# Patient Record
Sex: FEMALE | Race: ASIAN | Employment: UNEMPLOYED | URBAN - METROPOLITAN AREA
[De-identification: names, ages, dates, MRNs, and addresses within clinical notes are randomized per-mention and may not be internally consistent; named-entity substitution may affect disease eponyms.]

---

## 2017-08-16 ENCOUNTER — OFFICE VISIT (OUTPATIENT)
Dept: FAMILY MEDICINE CLINIC | Facility: CLINIC | Age: 37
End: 2017-08-16

## 2017-08-16 ENCOUNTER — HOSPITAL ENCOUNTER (OUTPATIENT)
Dept: GENERAL RADIOLOGY | Age: 37
Discharge: HOME | End: 2017-08-16
Attending: FAMILY MEDICINE
Payer: COMMERCIAL

## 2017-08-16 VITALS
BODY MASS INDEX: 21.44 KG/M2 | WEIGHT: 121 LBS | HEART RATE: 80 BPM | TEMPERATURE: 98 F | HEIGHT: 63 IN | DIASTOLIC BLOOD PRESSURE: 72 MMHG | SYSTOLIC BLOOD PRESSURE: 110 MMHG | RESPIRATION RATE: 16 BRPM

## 2017-08-16 DIAGNOSIS — Z00.00 ROUTINE PHYSICAL EXAMINATION: ICD-10-CM

## 2017-08-16 DIAGNOSIS — M54.12 CERVICAL RADICULOPATHY: ICD-10-CM

## 2017-08-16 DIAGNOSIS — Z00.00 ROUTINE PHYSICAL EXAMINATION: Primary | ICD-10-CM

## 2017-08-16 PROCEDURE — 99395 PREV VISIT EST AGE 18-39: CPT | Performed by: FAMILY MEDICINE

## 2017-08-16 PROCEDURE — 72040 X-RAY EXAM NECK SPINE 2-3 VW: CPT | Performed by: FAMILY MEDICINE

## 2017-08-16 PROCEDURE — 90471 IMMUNIZATION ADMIN: CPT | Performed by: FAMILY MEDICINE

## 2017-08-16 PROCEDURE — 90715 TDAP VACCINE 7 YRS/> IM: CPT | Performed by: FAMILY MEDICINE

## 2017-08-16 RX ORDER — DOXEPIN HYDROCHLORIDE 50 MG/1
1 CAPSULE ORAL DAILY
COMMUNITY

## 2017-08-16 RX ORDER — METHYLPREDNISOLONE 4 MG/1
TABLET ORAL
Qty: 1 KIT | Refills: 0 | Status: SHIPPED | OUTPATIENT
Start: 2017-08-16 | End: 2020-02-07

## 2017-08-16 NOTE — PROGRESS NOTES
Blood pressure 110/72, pulse 80, temperature 98.3 °F (36.8 °C), temperature source Oral, resp. rate 16, height 5' 3\" (1.6 m), weight 121 lb (54.9 kg).     REASON FOR VISIT:    Puneet Oliver is a 43year old female who presents for an [address] [place] [address] No results found for: FOB, OCCULTSTOOL    Obesity Screening Screen all adults annually Body mass index is 21.43 kg/m².       Preventive Services for Which Recommendations Vary with Risk Recommendation Internal Lab or Procedure External Lab or Procedure   Ch Take 1 tablet by mouth daily. Disp:  Rfl:    methylPREDNISolone (MEDROL) 4 MG Oral Tablet Therapy Pack As directed. Disp: 1 kit Rfl: 0      MEDICAL INFORMATION:   History reviewed. No pertinent past medical history.    Past Surgical History:  2012: C-SECTIO back  EXTREMITIES: no cyanosis, clubbing or edema  NEURO: Oriented times three, cranial nerves are intact, motor and sensory are grossly intact  NEG PHALENS/SPURLING   CN II-XII GROSSLY INTACT MUSCLE STRENGTH +5/5 UPPER AND LOWER EXTREMITIES B/L DTR'S UPPE

## 2017-08-24 ENCOUNTER — APPOINTMENT (OUTPATIENT)
Dept: LAB | Age: 37
End: 2017-08-24
Attending: FAMILY MEDICINE
Payer: COMMERCIAL

## 2017-08-24 DIAGNOSIS — M54.12 CERVICAL RADICULOPATHY: ICD-10-CM

## 2017-08-24 DIAGNOSIS — Z00.00 ROUTINE PHYSICAL EXAMINATION: ICD-10-CM

## 2017-08-24 LAB
CHOLEST SERPL-MCNC: 143 MG/DL (ref 110–200)
GLUCOSE SERPL-MCNC: 73 MG/DL (ref 70–99)
HDLC SERPL-MCNC: 47 MG/DL
LDLC SERPL CALC-MCNC: 73 MG/DL (ref 0–99)
NONHDLC SERPL-MCNC: 96 MG/DL
TRIGL SERPL-MCNC: 115 MG/DL (ref 1–149)

## 2017-08-24 PROCEDURE — 36415 COLL VENOUS BLD VENIPUNCTURE: CPT

## 2017-08-24 PROCEDURE — 80061 LIPID PANEL: CPT

## 2017-08-24 PROCEDURE — 82947 ASSAY GLUCOSE BLOOD QUANT: CPT

## 2017-08-25 ENCOUNTER — TELEPHONE (OUTPATIENT)
Dept: FAMILY MEDICINE CLINIC | Facility: CLINIC | Age: 37
End: 2017-08-25

## 2017-08-30 ENCOUNTER — OFFICE VISIT (OUTPATIENT)
Dept: OBGYN CLINIC | Facility: CLINIC | Age: 37
End: 2017-08-30

## 2017-08-30 VITALS — WEIGHT: 122 LBS | DIASTOLIC BLOOD PRESSURE: 71 MMHG | BODY MASS INDEX: 22 KG/M2 | SYSTOLIC BLOOD PRESSURE: 104 MMHG

## 2017-08-30 DIAGNOSIS — Z30.431 IUD CHECK UP: ICD-10-CM

## 2017-08-30 DIAGNOSIS — T83.32XA INTRAUTERINE CONTRACEPTIVE DEVICE THREADS LOST, INITIAL ENCOUNTER: ICD-10-CM

## 2017-08-30 DIAGNOSIS — Z01.419 ENCOUNTER FOR GYNECOLOGICAL EXAMINATION WITHOUT ABNORMAL FINDING: Primary | ICD-10-CM

## 2017-08-30 PROCEDURE — 99385 PREV VISIT NEW AGE 18-39: CPT | Performed by: OBSTETRICS & GYNECOLOGY

## 2017-08-30 NOTE — PROGRESS NOTES
Annual Gyn Exam    HPI Ms. Sly Juan is a new patient to me and she is here for an annual gynecologic evaluation.   She also is here for an IUD check as her IUD string was not visualized the last time she saw her previous gynecologist.  She had been instructed Negative for dyspareunia, dysuria, frequency, hematuria and vaginal discharge. Patient is amenorrheic with Mirena IUD   Neurological: Negative for headaches.        Physical Exam   Genitourinary: Vagina normal and uterus normal. There is no rash, ten

## 2017-09-01 LAB
HPV I/H RISK 1 DNA SPEC QL NAA+PROBE: NEGATIVE
LAST PAP RESULT: NORMAL

## 2017-10-05 ENCOUNTER — TELEPHONE (OUTPATIENT)
Dept: OBGYN CLINIC | Facility: CLINIC | Age: 37
End: 2017-10-05

## 2018-08-30 PROBLEM — Z97.5 IUD (INTRAUTERINE DEVICE) IN PLACE: Status: ACTIVE | Noted: 2018-08-30

## 2020-03-13 PROBLEM — Z86.32 HISTORY OF GESTATIONAL DIABETES: Status: ACTIVE | Noted: 2020-03-13
